# Patient Record
Sex: FEMALE | Race: WHITE | NOT HISPANIC OR LATINO | URBAN - METROPOLITAN AREA
[De-identification: names, ages, dates, MRNs, and addresses within clinical notes are randomized per-mention and may not be internally consistent; named-entity substitution may affect disease eponyms.]

---

## 2022-06-25 ENCOUNTER — CLINICAL SUPPORT (OUTPATIENT)
Dept: URGENT CARE | Facility: CLINIC | Age: 37
End: 2022-06-25
Payer: MEDICAID

## 2022-06-25 VITALS
BODY MASS INDEX: 21.74 KG/M2 | RESPIRATION RATE: 18 BRPM | WEIGHT: 130.5 LBS | SYSTOLIC BLOOD PRESSURE: 101 MMHG | OXYGEN SATURATION: 100 % | DIASTOLIC BLOOD PRESSURE: 72 MMHG | TEMPERATURE: 98 F | HEIGHT: 65 IN | HEART RATE: 76 BPM

## 2022-06-25 DIAGNOSIS — H53.8 BLURRED VISION, BILATERAL: Primary | ICD-10-CM

## 2022-06-25 PROCEDURE — 99204 OFFICE O/P NEW MOD 45 MIN: CPT | Mod: PBBFAC | Performed by: NURSE PRACTITIONER

## 2022-06-25 PROCEDURE — 99213 PR OFFICE/OUTPT VISIT, EST, LEVL III, 20-29 MIN: ICD-10-PCS | Mod: S$PBB,,, | Performed by: NURSE PRACTITIONER

## 2022-06-25 PROCEDURE — 99213 OFFICE O/P EST LOW 20 MIN: CPT | Mod: S$PBB,,, | Performed by: NURSE PRACTITIONER

## 2022-06-25 NOTE — PROGRESS NOTES
"Subjective:       Patient ID: Zheng Cortes is a 37 y.o. female.    Vitals:  height is 5' 5" (1.651 m) and weight is 59.2 kg (130 lb 8.2 oz). Her oral temperature is 97.9 °F (36.6 °C). Her blood pressure is 101/72 and her pulse is 76. Her respiration is 18 and oxygen saturation is 100%.     Chief Complaint: Eye Problem (Accidentally used dad's eye drops, atropine sulfat, yesterday.  Has blurred vision today.  /)    Patient is a 37-year-old female, here today for blurred vision after she accidentally used her dad's eyedrops (atropine Sulfate) yesterday.  Patient states she instilled 1 drop to the left eye.  Reports blurred vision today, trouble reading, photosensitivity.  Denies tunnel vision.      Neck: neck negative.   Cardiovascular: Negative.    Eyes: Positive for blurred vision.   Respiratory: Negative.        Objective:      Physical Exam   Constitutional: She is oriented to person, place, and time. She appears well-developed.   HENT:   Head: Normocephalic.   Eyes: Conjunctivae, EOM and lids are normal. Pupils are equal, round, and reactive to light. Right eye exhibits no discharge. Left eye exhibits no discharge. Extraocular movement intact gaze aligned appropriately   Neck: Neck supple.   Cardiovascular: Normal rate, regular rhythm and normal heart sounds.   Pulmonary/Chest: Effort normal and breath sounds normal.   Musculoskeletal: Normal range of motion.         General: Normal range of motion.   Neurological: She is alert and oriented to person, place, and time.   Skin: Skin is warm and dry. Capillary refill takes less than 2 seconds.   Psychiatric: Her behavior is normal.   Vitals reviewed.        Assessment:       1. Blurred vision, bilateral            No visits with results within 1 Day(s) from this visit.   Latest known visit with results is:   No results found for any previous visit.        No results found.   Plan:         Contacted poison control and they advised that eye flush may not work this " far our from eye drop instillation, there are no counteracting eye drops that can be ordered and pt would have to wait for the drops to wear off. Per poison control, If blurred vision continues >2-3 days then they need to follow up with an eye doctor. Pt's L eye flushed in office. If any decreased vision, tunnel vision or any new symptoms then go to ER. Referral to opthalmology sent for follow up.    Blurred vision, bilateral